# Patient Record
Sex: FEMALE | Race: WHITE | HISPANIC OR LATINO
[De-identification: names, ages, dates, MRNs, and addresses within clinical notes are randomized per-mention and may not be internally consistent; named-entity substitution may affect disease eponyms.]

---
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
## 2021-03-04 ENCOUNTER — RX RENEWAL (OUTPATIENT)
Age: 21
End: 2021-03-04

## 2021-05-04 ENCOUNTER — RX RENEWAL (OUTPATIENT)
Age: 21
End: 2021-05-04

## 2021-05-19 ENCOUNTER — NON-APPOINTMENT (OUTPATIENT)
Age: 21
End: 2021-05-19

## 2021-05-19 ENCOUNTER — APPOINTMENT (OUTPATIENT)
Dept: OBGYN | Facility: CLINIC | Age: 21
End: 2021-05-19
Payer: COMMERCIAL

## 2021-05-19 VITALS
SYSTOLIC BLOOD PRESSURE: 104 MMHG | DIASTOLIC BLOOD PRESSURE: 64 MMHG | BODY MASS INDEX: 18 KG/M2 | WEIGHT: 112 LBS | HEIGHT: 66 IN

## 2021-05-19 DIAGNOSIS — Z01.419 ENCOUNTER FOR GYNECOLOGICAL EXAMINATION (GENERAL) (ROUTINE) W/OUT ABNORMAL FINDINGS: ICD-10-CM

## 2021-05-19 PROCEDURE — 99395 PREV VISIT EST AGE 18-39: CPT

## 2021-06-01 ENCOUNTER — RX RENEWAL (OUTPATIENT)
Age: 21
End: 2021-06-01

## 2021-06-21 ENCOUNTER — RX RENEWAL (OUTPATIENT)
Age: 21
End: 2021-06-21

## 2022-04-23 RX ORDER — NORETHINDRONE ACETATE AND ETHINYL ESTRADIOL 1MG-20(21)
1-20 KIT ORAL
Qty: 1 | Refills: 0 | Status: ACTIVE | COMMUNITY
Start: 2020-04-02 | End: 1900-01-01

## 2022-05-23 ENCOUNTER — APPOINTMENT (OUTPATIENT)
Dept: OBGYN | Facility: CLINIC | Age: 22
End: 2022-05-23
Payer: COMMERCIAL

## 2022-05-23 VITALS
WEIGHT: 113 LBS | DIASTOLIC BLOOD PRESSURE: 64 MMHG | HEIGHT: 66 IN | BODY MASS INDEX: 18.16 KG/M2 | SYSTOLIC BLOOD PRESSURE: 110 MMHG

## 2022-05-23 DIAGNOSIS — B37.3 CANDIDIASIS OF VULVA AND VAGINA: ICD-10-CM

## 2022-05-23 DIAGNOSIS — N76.0 ACUTE VAGINITIS: ICD-10-CM

## 2022-05-23 DIAGNOSIS — Z11.3 ENCOUNTER FOR SCREENING FOR INFECTIONS WITH A PREDOMINANTLY SEXUAL MODE OF TRANSMISSION: ICD-10-CM

## 2022-05-23 PROCEDURE — 99395 PREV VISIT EST AGE 18-39: CPT

## 2022-05-23 PROCEDURE — 81003 URINALYSIS AUTO W/O SCOPE: CPT | Mod: QW

## 2022-05-23 RX ORDER — NYSTATIN AND TRIAMCINOLONE ACETONIDE 100000; 1 MG/G; MG/G
100000-0.1 CREAM TOPICAL TWICE DAILY
Qty: 1 | Refills: 0 | Status: ACTIVE | COMMUNITY
Start: 2022-05-23 | End: 1900-01-01

## 2022-05-23 RX ORDER — FLUCONAZOLE 150 MG/1
150 TABLET ORAL
Qty: 1 | Refills: 0 | Status: ACTIVE | COMMUNITY
Start: 2022-05-23 | End: 1900-01-01

## 2022-05-25 RX ORDER — METRONIDAZOLE 7.5 MG/G
0.75 GEL VAGINAL
Qty: 1 | Refills: 0 | Status: ACTIVE | COMMUNITY
Start: 2022-05-25 | End: 1900-01-01

## 2022-05-30 PROBLEM — N76.0 VULVOVAGINITIS: Status: RESOLVED | Noted: 2022-05-30 | Resolved: 2022-06-29

## 2022-05-31 LAB
BILIRUB UR QL STRIP: NORMAL
C TRACH RRNA SPEC QL NAA+PROBE: NOT DETECTED
CANDIDA VAG CYTO: NOT DETECTED
CYTOLOGY CVX/VAG DOC THIN PREP: NORMAL
G VAGINALIS+PREV SP MTYP VAG QL MICRO: DETECTED
GLUCOSE UR-MCNC: NORMAL
HCG UR QL: 0.2 EU/DL
HGB UR QL STRIP.AUTO: NORMAL
KETONES UR-MCNC: NORMAL
LEUKOCYTE ESTERASE UR QL STRIP: NORMAL
N GONORRHOEA RRNA SPEC QL NAA+PROBE: NOT DETECTED
NITRITE UR QL STRIP: NORMAL
PH UR STRIP: 6
PROT UR STRIP-MCNC: NORMAL
SOURCE TP AMPLIFICATION: NORMAL
SP GR UR STRIP: 1
T VAGINALIS VAG QL WET PREP: NOT DETECTED

## 2022-12-12 ENCOUNTER — NON-APPOINTMENT (OUTPATIENT)
Age: 22
End: 2022-12-12

## 2022-12-21 ENCOUNTER — APPOINTMENT (OUTPATIENT)
Dept: OBGYN | Facility: CLINIC | Age: 22
End: 2022-12-21

## 2023-04-12 RX ORDER — NORETHINDRONE ACETATE AND ETHINYL ESTRADIOL 1MG-20(24)
1-20 KIT ORAL
Qty: 84 | Refills: 0 | Status: ACTIVE | COMMUNITY
Start: 2021-06-01 | End: 1900-01-01

## 2023-05-15 ENCOUNTER — APPOINTMENT (OUTPATIENT)
Dept: OBGYN | Facility: CLINIC | Age: 23
End: 2023-05-15
Payer: COMMERCIAL

## 2023-05-15 VITALS
BODY MASS INDEX: 18.48 KG/M2 | SYSTOLIC BLOOD PRESSURE: 110 MMHG | HEIGHT: 66 IN | WEIGHT: 115 LBS | DIASTOLIC BLOOD PRESSURE: 60 MMHG

## 2023-05-15 DIAGNOSIS — N89.8 OTHER SPECIFIED NONINFLAMMATORY DISORDERS OF VAGINA: ICD-10-CM

## 2023-05-15 PROCEDURE — 99395 PREV VISIT EST AGE 18-39: CPT

## 2023-05-17 LAB
C TRACH RRNA SPEC QL NAA+PROBE: NOT DETECTED
CANDIDA VAG CYTO: NOT DETECTED
G VAGINALIS+PREV SP MTYP VAG QL MICRO: NOT DETECTED
N GONORRHOEA RRNA SPEC QL NAA+PROBE: NOT DETECTED
SOURCE TP AMPLIFICATION: NORMAL
T VAGINALIS VAG QL WET PREP: NOT DETECTED

## 2023-05-30 ENCOUNTER — APPOINTMENT (OUTPATIENT)
Dept: OBGYN | Facility: CLINIC | Age: 23
End: 2023-05-30

## 2023-06-01 LAB — CYTOLOGY CVX/VAG DOC THIN PREP: ABNORMAL

## 2023-06-02 ENCOUNTER — NON-APPOINTMENT (OUTPATIENT)
Age: 23
End: 2023-06-02

## 2023-06-12 ENCOUNTER — APPOINTMENT (OUTPATIENT)
Dept: OBGYN | Facility: CLINIC | Age: 23
End: 2023-06-12
Payer: COMMERCIAL

## 2023-06-12 VITALS
BODY MASS INDEX: 18.48 KG/M2 | DIASTOLIC BLOOD PRESSURE: 60 MMHG | HEIGHT: 66 IN | SYSTOLIC BLOOD PRESSURE: 110 MMHG | WEIGHT: 115 LBS

## 2023-06-12 DIAGNOSIS — Z11.51 ENCOUNTER FOR SCREENING FOR HUMAN PAPILLOMAVIRUS (HPV): ICD-10-CM

## 2023-06-12 DIAGNOSIS — R87.612 LOW GRADE SQUAMOUS INTRAEPITHELIAL LESION ON CYTOLOGIC SMEAR OF CERVIX (LGSIL): ICD-10-CM

## 2023-06-12 PROCEDURE — 57454 BX/CURETT OF CERVIX W/SCOPE: CPT

## 2023-06-12 RX ORDER — NORETHINDRONE ACETATE AND ETHINYL ESTRADIOL AND FERROUS FUMARATE 1.5-30(21)
1.5-3 KIT ORAL DAILY
Qty: 3 | Refills: 3 | Status: ACTIVE | COMMUNITY
Start: 2023-05-15 | End: 1900-01-01

## 2023-06-27 ENCOUNTER — NON-APPOINTMENT (OUTPATIENT)
Age: 23
End: 2023-06-27

## 2023-07-02 LAB — HPV HIGH+LOW RISK DNA PNL CVX: DETECTED

## 2023-08-07 ENCOUNTER — APPOINTMENT (OUTPATIENT)
Dept: OBGYN | Facility: CLINIC | Age: 23
End: 2023-08-07
Payer: COMMERCIAL

## 2023-08-07 DIAGNOSIS — N87.1 MODERATE CERVICAL DYSPLASIA: ICD-10-CM

## 2023-08-07 PROCEDURE — 57461 CONZ OF CERVIX W/SCOPE LEEP: CPT

## 2023-08-07 NOTE — PROCEDURE
[Colposcopy with Loop Electrode Excision of the Cervix] : Colposcopy with loop electrode excision of the cervix [Consent Obtained] : Consent obtained [Severe Dysplasia] : severe dysplasia [Risks] : risks [Benefits] : benefits [Alternatives] : alternatives [Infection] : infection [Bleeding] : bleeding [Injury to Vagina] : injury to vagina [Injury to Cervix] : injury to cervix [SCJ Fully Visualized] : SCJ fully visualized [Cutting Setting ___watts] : cutting setting [unfilled] vigil [Coag Setting ___watts] : coag setting [unfilled] vigil [ECC Done] : ECC not done [Hemostasis Obtained] : Hemostasis obtained [Sent to Pathology] : the specimen was placed in buffered formalin and sent for pathology [Tolerated Well] : the patient tolerated the procedure well [de-identified] : lidocaine [de-identified] : with ball cautery

## 2023-09-18 ENCOUNTER — APPOINTMENT (OUTPATIENT)
Dept: OBGYN | Facility: CLINIC | Age: 23
End: 2023-09-18
Payer: COMMERCIAL

## 2023-09-18 VITALS
BODY MASS INDEX: 18.48 KG/M2 | WEIGHT: 115 LBS | DIASTOLIC BLOOD PRESSURE: 62 MMHG | SYSTOLIC BLOOD PRESSURE: 100 MMHG | HEIGHT: 66 IN

## 2023-09-18 PROCEDURE — 99212 OFFICE O/P EST SF 10 MIN: CPT

## 2023-11-07 ENCOUNTER — APPOINTMENT (OUTPATIENT)
Dept: OBGYN | Facility: CLINIC | Age: 23
End: 2023-11-07
Payer: COMMERCIAL

## 2023-11-07 ENCOUNTER — LABORATORY RESULT (OUTPATIENT)
Age: 23
End: 2023-11-07

## 2023-11-07 VITALS
HEIGHT: 66 IN | DIASTOLIC BLOOD PRESSURE: 60 MMHG | BODY MASS INDEX: 18.48 KG/M2 | WEIGHT: 115 LBS | SYSTOLIC BLOOD PRESSURE: 100 MMHG

## 2023-11-07 PROCEDURE — 99213 OFFICE O/P EST LOW 20 MIN: CPT | Mod: 25

## 2023-11-07 PROCEDURE — 57505 ENDOCERVICAL CURETTAGE: CPT

## 2024-04-29 ENCOUNTER — LABORATORY RESULT (OUTPATIENT)
Age: 24
End: 2024-04-29

## 2024-04-29 ENCOUNTER — APPOINTMENT (OUTPATIENT)
Dept: OBGYN | Facility: CLINIC | Age: 24
End: 2024-04-29
Payer: COMMERCIAL

## 2024-04-29 DIAGNOSIS — N64.52 NIPPLE DISCHARGE: ICD-10-CM

## 2024-04-29 DIAGNOSIS — D06.9 CARCINOMA IN SITU OF CERVIX, UNSPECIFIED: ICD-10-CM

## 2024-04-29 DIAGNOSIS — Z01.411 ENCOUNTER FOR GYNECOLOGICAL EXAMINATION (GENERAL) (ROUTINE) WITH ABNORMAL FINDINGS: ICD-10-CM

## 2024-04-29 PROCEDURE — 99395 PREV VISIT EST AGE 18-39: CPT

## 2024-04-29 NOTE — PHYSICAL EXAM
[FreeTextEntry5] : appeared well healed. ectropion seen from  LEEP. [Awake] : awake [Alert] : alert [Soft] : soft [Oriented x3] : oriented to person, place, and time [Scant] : scant [White] : white [Uterine Adnexae] : were not tender and not enlarged [Breast Palpation Diffuse Fibrous Tissue Bilateral] : fibrocystic changes [Acute Distress] : no acute distress [Mass] : no breast mass [Axillary LAD] : no axillary lymphadenopathy [Tender] : non tender [Normal] : vagina

## 2024-05-01 LAB — PROLACTIN SERPL-MCNC: 19.3 NG/ML

## 2024-05-03 ENCOUNTER — RESULT REVIEW (OUTPATIENT)
Age: 24
End: 2024-05-03

## 2024-05-03 ENCOUNTER — TRANSCRIPTION ENCOUNTER (OUTPATIENT)
Age: 24
End: 2024-05-03

## 2024-05-03 ENCOUNTER — APPOINTMENT (OUTPATIENT)
Dept: ULTRASOUND IMAGING | Facility: IMAGING CENTER | Age: 24
End: 2024-05-03
Payer: COMMERCIAL

## 2024-05-03 ENCOUNTER — OUTPATIENT (OUTPATIENT)
Dept: OUTPATIENT SERVICES | Facility: HOSPITAL | Age: 24
LOS: 1 days | End: 2024-05-03
Payer: COMMERCIAL

## 2024-05-03 DIAGNOSIS — N64.52 NIPPLE DISCHARGE: ICD-10-CM

## 2024-05-03 PROCEDURE — 76642 ULTRASOUND BREAST LIMITED: CPT

## 2024-05-03 PROCEDURE — 76642 ULTRASOUND BREAST LIMITED: CPT | Mod: 26,RT

## 2024-09-10 DIAGNOSIS — Z78.9 OTHER SPECIFIED HEALTH STATUS: ICD-10-CM

## 2024-10-07 RX ORDER — NORETHINDRONE ACETATE AND ETHINYL ESTRADIOL 1.5; .03 MG/1; MG/1
1.5-3 TABLET ORAL DAILY
Qty: 28 | Refills: 0 | Status: ACTIVE | COMMUNITY
Start: 2024-10-04 | End: 1900-01-01

## 2025-04-30 ENCOUNTER — APPOINTMENT (OUTPATIENT)
Dept: OBGYN | Facility: CLINIC | Age: 25
End: 2025-04-30
Payer: COMMERCIAL

## 2025-04-30 VITALS
HEIGHT: 66 IN | DIASTOLIC BLOOD PRESSURE: 75 MMHG | SYSTOLIC BLOOD PRESSURE: 115 MMHG | WEIGHT: 115 LBS | BODY MASS INDEX: 18.48 KG/M2

## 2025-04-30 DIAGNOSIS — N64.52 NIPPLE DISCHARGE: ICD-10-CM

## 2025-04-30 DIAGNOSIS — Z01.411 ENCOUNTER FOR GYNECOLOGICAL EXAMINATION (GENERAL) (ROUTINE) WITH ABNORMAL FINDINGS: ICD-10-CM

## 2025-04-30 PROCEDURE — 99395 PREV VISIT EST AGE 18-39: CPT

## 2025-05-08 LAB — CYTOLOGY CVX/VAG DOC THIN PREP: NORMAL
